# Patient Record
Sex: FEMALE
[De-identification: names, ages, dates, MRNs, and addresses within clinical notes are randomized per-mention and may not be internally consistent; named-entity substitution may affect disease eponyms.]

---

## 2021-03-09 ENCOUNTER — HOSPITAL ENCOUNTER (OUTPATIENT)
Dept: HOSPITAL 53 - M WUC | Age: 26
End: 2021-03-09
Attending: PHYSICIAN ASSISTANT
Payer: OTHER GOVERNMENT

## 2021-03-09 DIAGNOSIS — R10.30: Primary | ICD-10-CM

## 2021-03-09 DIAGNOSIS — Z97.5: ICD-10-CM

## 2021-03-09 LAB
ALBUMIN SERPL BCG-MCNC: 4.2 GM/DL (ref 3.2–5.2)
ALT SERPL W P-5'-P-CCNC: 33 U/L (ref 12–78)
BASOPHILS # BLD AUTO: 0.1 10^3/UL (ref 0–0.2)
BASOPHILS NFR BLD AUTO: 0.8 % (ref 0–1)
BILIRUB SERPL-MCNC: 0.4 MG/DL (ref 0.2–1)
BUN SERPL-MCNC: 9 MG/DL (ref 7–18)
CALCIUM SERPL-MCNC: 9.6 MG/DL (ref 8.5–10.1)
CHLORIDE SERPL-SCNC: 105 MEQ/L (ref 98–107)
CO2 SERPL-SCNC: 25 MEQ/L (ref 21–32)
CREAT SERPL-MCNC: 0.87 MG/DL (ref 0.55–1.3)
EOSINOPHIL # BLD AUTO: 0.3 10^3/UL (ref 0–0.5)
EOSINOPHIL NFR BLD AUTO: 3.5 % (ref 0–3)
GFR SERPL CREATININE-BSD FRML MDRD: > 60 ML/MIN/{1.73_M2} (ref 60–?)
GLUCOSE SERPL-MCNC: 84 MG/DL (ref 70–100)
HCT VFR BLD AUTO: 36.3 % (ref 36–47)
HGB BLD-MCNC: 10.9 G/DL (ref 12–15.5)
IRON SATN MFR SERPL: 4.3 % (ref 13.2–45)
IRON SERPL-MCNC: 23 UG/DL (ref 50–170)
LYMPHOCYTES # BLD AUTO: 2.9 10^3/UL (ref 1.5–5)
LYMPHOCYTES NFR BLD AUTO: 29.7 % (ref 24–44)
MCH RBC QN AUTO: 25 PG (ref 27–33)
MCHC RBC AUTO-ENTMCNC: 30 G/DL (ref 32–36.5)
MCV RBC AUTO: 83.3 FL (ref 80–96)
MONOCYTES # BLD AUTO: 0.5 10^3/UL (ref 0–0.8)
MONOCYTES NFR BLD AUTO: 5.3 % (ref 2–8)
NEUTROPHILS # BLD AUTO: 5.9 10^3/UL (ref 1.5–8.5)
NEUTROPHILS NFR BLD AUTO: 60.4 % (ref 36–66)
PLATELET # BLD AUTO: 478 10^3/UL (ref 150–450)
POTASSIUM SERPL-SCNC: 4.4 MEQ/L (ref 3.5–5.1)
PROLACTIN SERPL-MCNC: 8.9 NG/ML
PROT SERPL-MCNC: 8 GM/DL (ref 6.4–8.2)
RBC # BLD AUTO: 4.36 10^6/UL (ref 4–5.4)
SODIUM SERPL-SCNC: 137 MEQ/L (ref 136–145)
T4 FREE SERPL-MCNC: 1.01 NG/DL (ref 0.76–1.46)
TIBC SERPL-MCNC: 529 UG/DL (ref 250–450)
TSH SERPL DL<=0.005 MIU/L-ACNC: 1.75 UIU/ML (ref 0.36–3.74)
WBC # BLD AUTO: 9.8 10^3/UL (ref 4–10)

## 2021-03-09 NOTE — REP
INDICATION:

LOWER ABDOMINAL PAIN



COMPARISON:

None.



TECHNIQUE:

Supine view of the abdomen and pelvis.



FINDINGS:

Bowel gas pattern is nonspecific and without obstruction or perforation.  No

organomegaly.  No abnormal calcifications.  Skeletal structures intact.  IUD in

satisfactory position.



IMPRESSION:

Normal abdominal radiograph.





<Electronically signed by Troy Bingham > 03/09/21 [id number]